# Patient Record
Sex: FEMALE | Race: WHITE
[De-identification: names, ages, dates, MRNs, and addresses within clinical notes are randomized per-mention and may not be internally consistent; named-entity substitution may affect disease eponyms.]

---

## 2017-01-20 ENCOUNTER — APPOINTMENT (OUTPATIENT)
Dept: ORTHOPEDIC SURGERY | Facility: CLINIC | Age: 57
End: 2017-01-20

## 2017-01-20 VITALS — BODY MASS INDEX: 39.24 KG/M2 | WEIGHT: 250 LBS | HEIGHT: 67 IN

## 2017-01-20 DIAGNOSIS — Z87.39 PERSONAL HISTORY OF OTHER DISEASES OF THE MUSCULOSKELETAL SYSTEM AND CONNECTIVE TISSUE: ICD-10-CM

## 2017-01-20 DIAGNOSIS — S83.232A COMPLEX TEAR OF MEDIAL MENISCUS, CURRENT INJURY, LEFT KNEE, INITIAL ENCOUNTER: ICD-10-CM

## 2017-03-06 ENCOUNTER — OUTPATIENT (OUTPATIENT)
Dept: OUTPATIENT SERVICES | Facility: HOSPITAL | Age: 57
LOS: 1 days | End: 2017-03-06
Payer: COMMERCIAL

## 2017-03-06 DIAGNOSIS — M54.16 RADICULOPATHY, LUMBAR REGION: ICD-10-CM

## 2017-03-06 PROCEDURE — 62323 NJX INTERLAMINAR LMBR/SAC: CPT

## 2017-03-06 PROCEDURE — 77003 FLUOROGUIDE FOR SPINE INJECT: CPT

## 2017-07-13 ENCOUNTER — OUTPATIENT (OUTPATIENT)
Dept: OUTPATIENT SERVICES | Facility: HOSPITAL | Age: 57
LOS: 1 days | End: 2017-07-13
Payer: COMMERCIAL

## 2017-07-13 ENCOUNTER — APPOINTMENT (OUTPATIENT)
Dept: OBGYN | Facility: CLINIC | Age: 57
End: 2017-07-13

## 2017-07-13 DIAGNOSIS — M54.16 RADICULOPATHY, LUMBAR REGION: ICD-10-CM

## 2017-07-13 PROCEDURE — 62323 NJX INTERLAMINAR LMBR/SAC: CPT

## 2017-07-13 PROCEDURE — 77003 FLUOROGUIDE FOR SPINE INJECT: CPT

## 2017-08-10 ENCOUNTER — OUTPATIENT (OUTPATIENT)
Dept: OUTPATIENT SERVICES | Facility: HOSPITAL | Age: 57
LOS: 1 days | End: 2017-08-10
Payer: COMMERCIAL

## 2017-08-10 DIAGNOSIS — M54.16 RADICULOPATHY, LUMBAR REGION: ICD-10-CM

## 2017-08-10 PROCEDURE — 77003 FLUOROGUIDE FOR SPINE INJECT: CPT

## 2017-08-10 PROCEDURE — 62323 NJX INTERLAMINAR LMBR/SAC: CPT

## 2018-02-28 ENCOUNTER — RESULT REVIEW (OUTPATIENT)
Age: 58
End: 2018-02-28

## 2018-02-28 ENCOUNTER — OUTPATIENT (OUTPATIENT)
Dept: OUTPATIENT SERVICES | Facility: HOSPITAL | Age: 58
LOS: 1 days | Discharge: HOME | End: 2018-02-28
Payer: COMMERCIAL

## 2018-02-28 VITALS
OXYGEN SATURATION: 97 % | TEMPERATURE: 98 F | HEART RATE: 72 BPM | SYSTOLIC BLOOD PRESSURE: 113 MMHG | RESPIRATION RATE: 14 BRPM | DIASTOLIC BLOOD PRESSURE: 61 MMHG

## 2018-02-28 VITALS
HEIGHT: 67 IN | HEART RATE: 65 BPM | DIASTOLIC BLOOD PRESSURE: 65 MMHG | TEMPERATURE: 98 F | OXYGEN SATURATION: 97 % | RESPIRATION RATE: 16 BRPM | SYSTOLIC BLOOD PRESSURE: 109 MMHG | WEIGHT: 250.45 LBS

## 2018-02-28 DIAGNOSIS — Z98.890 OTHER SPECIFIED POSTPROCEDURAL STATES: Chronic | ICD-10-CM

## 2018-02-28 DIAGNOSIS — E89.0 POSTPROCEDURAL HYPOTHYROIDISM: Chronic | ICD-10-CM

## 2018-02-28 LAB — HCG UR QL: NEGATIVE — SIGNIFICANT CHANGE UP

## 2018-02-28 PROCEDURE — 88305 TISSUE EXAM BY PATHOLOGIST: CPT | Mod: 26

## 2018-02-28 RX ORDER — ONDANSETRON 8 MG/1
4 TABLET, FILM COATED ORAL ONCE
Qty: 0 | Refills: 0 | Status: DISCONTINUED | OUTPATIENT
Start: 2018-02-28 | End: 2018-03-15

## 2018-02-28 RX ORDER — OXYCODONE HYDROCHLORIDE 5 MG/1
5 TABLET ORAL EVERY 4 HOURS
Qty: 0 | Refills: 0 | Status: DISCONTINUED | OUTPATIENT
Start: 2018-02-28 | End: 2018-02-28

## 2018-02-28 RX ORDER — GABAPENTIN 400 MG/1
900 CAPSULE ORAL
Qty: 0 | Refills: 0 | COMMUNITY

## 2018-02-28 RX ORDER — ACETAMINOPHEN 500 MG
1000 TABLET ORAL ONCE
Qty: 0 | Refills: 0 | Status: DISCONTINUED | OUTPATIENT
Start: 2018-02-28 | End: 2018-03-15

## 2018-02-28 RX ORDER — LEVOTHYROXINE SODIUM 125 MCG
1 TABLET ORAL
Qty: 0 | Refills: 0 | COMMUNITY

## 2018-02-28 RX ORDER — FENTANYL CITRATE 50 UG/ML
50 INJECTION INTRAVENOUS
Qty: 0 | Refills: 0 | Status: DISCONTINUED | OUTPATIENT
Start: 2018-02-28 | End: 2018-02-28

## 2018-02-28 RX ORDER — IBUPROFEN 200 MG
400 TABLET ORAL EVERY 8 HOURS
Qty: 0 | Refills: 0 | Status: DISCONTINUED | OUTPATIENT
Start: 2018-02-28 | End: 2018-03-15

## 2018-02-28 RX ORDER — SERTRALINE 25 MG/1
1 TABLET, FILM COATED ORAL
Qty: 0 | Refills: 0 | COMMUNITY

## 2018-02-28 RX ORDER — SODIUM CHLORIDE 9 MG/ML
1000 INJECTION INTRAMUSCULAR; INTRAVENOUS; SUBCUTANEOUS
Qty: 0 | Refills: 0 | Status: DISCONTINUED | OUTPATIENT
Start: 2018-02-28 | End: 2018-03-15

## 2018-02-28 RX ORDER — BUPROPION HYDROCHLORIDE 150 MG/1
0 TABLET, EXTENDED RELEASE ORAL
Qty: 0 | Refills: 0 | COMMUNITY

## 2018-02-28 NOTE — ASU DISCHARGE PLAN (ADULT/PEDIATRIC). - MEDICATION SUMMARY - MEDICATIONS TO CHANGE
I will SWITCH the dose or number of times a day I take the medications listed below when I get home from the hospital:    Synthroid 150 mcg (0.15 mg) oral tablet  -- 1 tab(s) by mouth once a day    Wellbutrin 100 mg oral tablet  -- orally once a day    Zoloft 50 mg oral tablet  -- 1 tab(s) by mouth once a day    Neurontin  -- 900 milligram(s) by mouth 1 to 2 times a day

## 2018-03-06 DIAGNOSIS — D17.0 BENIGN LIPOMATOUS NEOPLASM OF SKIN AND SUBCUTANEOUS TISSUE OF HEAD, FACE AND NECK: ICD-10-CM

## 2020-10-15 ENCOUNTER — EMERGENCY (EMERGENCY)
Facility: HOSPITAL | Age: 60
LOS: 0 days | Discharge: HOME | End: 2020-10-15
Attending: EMERGENCY MEDICINE
Payer: COMMERCIAL

## 2020-10-15 VITALS
DIASTOLIC BLOOD PRESSURE: 71 MMHG | HEART RATE: 80 BPM | SYSTOLIC BLOOD PRESSURE: 129 MMHG | OXYGEN SATURATION: 100 % | RESPIRATION RATE: 17 BRPM

## 2020-10-15 VITALS
WEIGHT: 255.07 LBS | DIASTOLIC BLOOD PRESSURE: 80 MMHG | OXYGEN SATURATION: 100 % | SYSTOLIC BLOOD PRESSURE: 137 MMHG | RESPIRATION RATE: 18 BRPM | HEART RATE: 92 BPM | HEIGHT: 67 IN | TEMPERATURE: 98 F

## 2020-10-15 DIAGNOSIS — E89.0 POSTPROCEDURAL HYPOTHYROIDISM: ICD-10-CM

## 2020-10-15 DIAGNOSIS — R06.02 SHORTNESS OF BREATH: ICD-10-CM

## 2020-10-15 DIAGNOSIS — R07.89 OTHER CHEST PAIN: ICD-10-CM

## 2020-10-15 DIAGNOSIS — Z98.84 BARIATRIC SURGERY STATUS: ICD-10-CM

## 2020-10-15 DIAGNOSIS — Z98.84 BARIATRIC SURGERY STATUS: Chronic | ICD-10-CM

## 2020-10-15 DIAGNOSIS — Z98.890 OTHER SPECIFIED POSTPROCEDURAL STATES: Chronic | ICD-10-CM

## 2020-10-15 DIAGNOSIS — E89.0 POSTPROCEDURAL HYPOTHYROIDISM: Chronic | ICD-10-CM

## 2020-10-15 PROBLEM — K82.9 DISEASE OF GALLBLADDER, UNSPECIFIED: Chronic | Status: ACTIVE | Noted: 2018-02-28

## 2020-10-15 LAB
ALBUMIN SERPL ELPH-MCNC: 3.6 G/DL — SIGNIFICANT CHANGE UP (ref 3.3–5)
ALP SERPL-CCNC: 98 U/L — SIGNIFICANT CHANGE UP (ref 40–120)
ALT FLD-CCNC: 33 U/L — SIGNIFICANT CHANGE UP (ref 12–78)
ANION GAP SERPL CALC-SCNC: 5 MMOL/L — SIGNIFICANT CHANGE UP (ref 5–17)
AST SERPL-CCNC: 33 U/L — SIGNIFICANT CHANGE UP (ref 15–37)
BASOPHILS # BLD AUTO: 0.05 K/UL — SIGNIFICANT CHANGE UP (ref 0–0.2)
BASOPHILS NFR BLD AUTO: 0.4 % — SIGNIFICANT CHANGE UP (ref 0–2)
BILIRUB SERPL-MCNC: 0.5 MG/DL — SIGNIFICANT CHANGE UP (ref 0.2–1.2)
BUN SERPL-MCNC: 20 MG/DL — SIGNIFICANT CHANGE UP (ref 7–23)
CALCIUM SERPL-MCNC: 8.7 MG/DL — SIGNIFICANT CHANGE UP (ref 8.5–10.1)
CHLORIDE SERPL-SCNC: 107 MMOL/L — SIGNIFICANT CHANGE UP (ref 96–108)
CO2 SERPL-SCNC: 28 MMOL/L — SIGNIFICANT CHANGE UP (ref 22–31)
CREAT SERPL-MCNC: 0.81 MG/DL — SIGNIFICANT CHANGE UP (ref 0.5–1.3)
D DIMER BLD IA.RAPID-MCNC: 213 NG/ML DDU — SIGNIFICANT CHANGE UP
EOSINOPHIL # BLD AUTO: 0.17 K/UL — SIGNIFICANT CHANGE UP (ref 0–0.5)
EOSINOPHIL NFR BLD AUTO: 1.3 % — SIGNIFICANT CHANGE UP (ref 0–6)
GLUCOSE SERPL-MCNC: 86 MG/DL — SIGNIFICANT CHANGE UP (ref 70–99)
HCT VFR BLD CALC: 42.1 % — SIGNIFICANT CHANGE UP (ref 34.5–45)
HGB BLD-MCNC: 13.6 G/DL — SIGNIFICANT CHANGE UP (ref 11.5–15.5)
IMM GRANULOCYTES NFR BLD AUTO: 0.3 % — SIGNIFICANT CHANGE UP (ref 0–1.5)
LYMPHOCYTES # BLD AUTO: 1.43 K/UL — SIGNIFICANT CHANGE UP (ref 1–3.3)
LYMPHOCYTES # BLD AUTO: 10.8 % — LOW (ref 13–44)
MCHC RBC-ENTMCNC: 30 PG — SIGNIFICANT CHANGE UP (ref 27–34)
MCHC RBC-ENTMCNC: 32.3 GM/DL — SIGNIFICANT CHANGE UP (ref 32–36)
MCV RBC AUTO: 92.9 FL — SIGNIFICANT CHANGE UP (ref 80–100)
MONOCYTES # BLD AUTO: 0.96 K/UL — HIGH (ref 0–0.9)
MONOCYTES NFR BLD AUTO: 7.2 % — SIGNIFICANT CHANGE UP (ref 2–14)
NEUTROPHILS # BLD AUTO: 10.62 K/UL — HIGH (ref 1.8–7.4)
NEUTROPHILS NFR BLD AUTO: 80 % — HIGH (ref 43–77)
NT-PROBNP SERPL-SCNC: 140 PG/ML — HIGH (ref 0–125)
PLATELET # BLD AUTO: 273 K/UL — SIGNIFICANT CHANGE UP (ref 150–400)
POTASSIUM SERPL-MCNC: 3.8 MMOL/L — SIGNIFICANT CHANGE UP (ref 3.5–5.3)
POTASSIUM SERPL-SCNC: 3.8 MMOL/L — SIGNIFICANT CHANGE UP (ref 3.5–5.3)
PROT SERPL-MCNC: 7.5 GM/DL — SIGNIFICANT CHANGE UP (ref 6–8.3)
RBC # BLD: 4.53 M/UL — SIGNIFICANT CHANGE UP (ref 3.8–5.2)
RBC # FLD: 15 % — HIGH (ref 10.3–14.5)
SARS-COV-2 RNA SPEC QL NAA+PROBE: SIGNIFICANT CHANGE UP
SODIUM SERPL-SCNC: 140 MMOL/L — SIGNIFICANT CHANGE UP (ref 135–145)
TROPONIN I SERPL-MCNC: <0.015 NG/ML — SIGNIFICANT CHANGE UP (ref 0.01–0.04)
TROPONIN I SERPL-MCNC: <0.015 NG/ML — SIGNIFICANT CHANGE UP (ref 0.01–0.04)
WBC # BLD: 13.27 K/UL — HIGH (ref 3.8–10.5)
WBC # FLD AUTO: 13.27 K/UL — HIGH (ref 3.8–10.5)

## 2020-10-15 PROCEDURE — 80053 COMPREHEN METABOLIC PANEL: CPT

## 2020-10-15 PROCEDURE — 85379 FIBRIN DEGRADATION QUANT: CPT

## 2020-10-15 PROCEDURE — 71045 X-RAY EXAM CHEST 1 VIEW: CPT

## 2020-10-15 PROCEDURE — 93010 ELECTROCARDIOGRAM REPORT: CPT

## 2020-10-15 PROCEDURE — 83880 ASSAY OF NATRIURETIC PEPTIDE: CPT

## 2020-10-15 PROCEDURE — 36415 COLL VENOUS BLD VENIPUNCTURE: CPT

## 2020-10-15 PROCEDURE — U0003: CPT

## 2020-10-15 PROCEDURE — 84484 ASSAY OF TROPONIN QUANT: CPT

## 2020-10-15 PROCEDURE — 85025 COMPLETE CBC W/AUTO DIFF WBC: CPT

## 2020-10-15 PROCEDURE — 99285 EMERGENCY DEPT VISIT HI MDM: CPT

## 2020-10-15 PROCEDURE — 71045 X-RAY EXAM CHEST 1 VIEW: CPT | Mod: 26

## 2020-10-15 PROCEDURE — 93005 ELECTROCARDIOGRAM TRACING: CPT

## 2020-10-15 PROCEDURE — 99284 EMERGENCY DEPT VISIT MOD MDM: CPT | Mod: 25

## 2020-10-15 NOTE — ED ADULT NURSE REASSESSMENT NOTE - NS ED NURSE REASSESS COMMENT FT1
received report from rn puja- pt resting comfortably, vitals stable, awaiting 2nd troponin at 9, pt aware of plan of care

## 2020-10-15 NOTE — ED PROVIDER NOTE - PROGRESS NOTE DETAILS
Attending Dr. Abelardo Andres Attending Dr. Abelardo Andresd and d/w about pt.  Pt can be d/c and follow up with outpt rich Attending Andres, pt updated on results.  pt in NAD.  Plan d/c.

## 2020-10-15 NOTE — ED PROVIDER NOTE - PSH
H/O bariatric surgery  gastric sleeve - july 2020  H/O thyroidectomy    History of cholecystectomy

## 2020-10-15 NOTE — ED ADULT NURSE NOTE - OBJECTIVE STATEMENT
pt. presents to ED ambulatory complaining of non-radiating midsternal chest pressure onset at 3am. c/o mild b/l arm tingling. Pt. denies having actual chest pain, N/V/ dizziness/palpitations. (-)fevers/chills/cough. PMH: hypothyroid, PSH: gastric sleeve, endarterectomy, thyroidectomy.

## 2020-10-15 NOTE — ED PROVIDER NOTE - PATIENT PORTAL LINK FT
You can access the FollowMyHealth Patient Portal offered by [facility] by registering at the following website: [email or web address]. By joining [facility]’s FollowMyHealth portal, you will also be able to view your health information using other applications (apps) compatible with our system.

## 2020-10-15 NOTE — ED PROVIDER NOTE - OBJECTIVE STATEMENT
59 yo pt presents to ED for chest pressure and SOB.  Pt started with symptoms today at 3am.  Pt woke up with pressure.  Pt noticed SOB with exertion.  Pressure mid chest with no radiation.  No sweats.  No travel, no sick contacts.  No fall or trauma.  No cough, fever, n/v/d.  pt s/p bariatric surgery at [facility] in July 2020 and has lost 40 lbs.  PMD = Dr. page.

## 2020-10-15 NOTE — ED ADULT TRIAGE NOTE - CHIEF COMPLAINT QUOTE
woke up with chest pain worsening with deep breath at 3am this morning. + sob. Denies palpitations, nausea, vomiting.

## 2020-10-15 NOTE — ED PROVIDER NOTE - NSFOLLOWUPINSTRUCTIONS_ED_ALL_ED_FT
Please call and follow up with your doctor in 1-3 days.  Please call and follow up with your bariatric surgeon.    Chest Pain    WHAT YOU NEED TO KNOW:    Chest pain can be caused by a range of conditions, from not serious to life-threatening. Chest pain can be a symptom of a digestive problem, such as acid reflux or a stomach ulcer. An anxiety attack or a strong emotion, such as anger, can also cause chest pain. Infection, inflammation, or a fracture in the bones or cartilage in your chest can cause pain or discomfort. Sometimes chest pain or pressure is caused by poor blood flow to your heart (angina). Chest pain may also be caused by life-threatening conditions such as a heart attack or blood clot in your lungs.     DISCHARGE INSTRUCTIONS:    Call 911 if:     You have any of the following signs of a heart attack:   Squeezing, pressure, or pain in your chest       and any of the following:   Discomfort or pain in your back, neck, jaw, stomach, or arm       Shortness of breath      Nausea or vomiting      Lightheadedness or a sudden cold sweat        Return to the emergency department if:     You have chest discomfort that gets worse, even with medicine.      You cough or vomit blood.       Your bowel movements are black or bloody.       You cannot stop vomiting, or it hurts to swallow.     Contact your healthcare provider if:     You have questions or concerns about your condition or care.        Medicines:     Medicines may be given to treat the cause of your chest pain. Examples include pain medicine, anxiety medicine, or medicines to increase blood flow to your heart.       Do not take certain medicines without asking your healthcare provider first. These include NSAIDs, herbal or vitamin supplements, or hormones (estrogen or progestin).       Take your medicine as directed. Contact your healthcare provider if you think your medicine is not helping or if you have side effects. Tell him or her if you are allergic to any medicine. Keep a list of the medicines, vitamins, and herbs you take. Include the amounts, and when and why you take them. Bring the list or the pill bottles to follow-up visits. Carry your medicine list with you in case of an emergency.    Follow up with your healthcare provider within 72 hours, or as directed: You may need to return for more tests to find the cause of your chest pain. You may be referred to a specialist, such as a cardiologist or gastroenterologist. Write down your questions so you remember to ask them during your visits.     Healthy living tips: The following are general healthy guidelines. If your chest pain is caused by a heart problem, your healthcare provider will give you specific guidelines to follow.    Do not smoke. Nicotine and other chemicals in cigarettes and cigars can cause lung and heart damage. Ask your healthcare provider for information if you currently smoke and need help to quit. E-cigarettes or smokeless tobacco still contain nicotine. Talk to your healthcare provider before you use these products.       Eat a variety of healthy, low-fat, low-salt foods. Healthy foods include fruits, vegetables, whole-grain breads, low-fat dairy products, beans, lean meats, and fish. Ask for more information about a heart healthy diet.      Drink plenty of water every day. Your body is made of mostly water. Water helps your body to control your temperature and blood pressure. Ask your healthcare provider how much water you should drink every day.      Ask about activity. Your healthcare provider will tell you which activities to limit or avoid. Ask when you can drive, return to work, and have sex. Ask about the best exercise plan for you.      Maintain a healthy weight. Ask your healthcare provider how much you should weigh. Ask him or her to help you create a weight loss plan if you are overweight.       Get the flu and pneumonia vaccines. All adults should get the influenza (flu) vaccine. Get it every year as soon as it becomes available. The pneumococcal vaccine is given to adults aged 65 years or older. The vaccine is given every 5 years to prevent pneumococcal disease, such as pneumonia.    If you have a stent:     Carry your stent card with you at all times.       Let all healthcare providers know that you have a stent.

## 2020-10-15 NOTE — ED PROVIDER NOTE - CPE EDP CARDIAC NORM
normal...
Implemented All Universal Safety Interventions:  [place] to call system. Call bell, personal items and telephone within reach. Instruct patient to call for assistance. Room bathroom lighting operational. Non-slip footwear when patient is off stretcher. Physically safe environment: no spills, clutter or unnecessary equipment. Stretcher in lowest position, wheels locked, appropriate side rails in place.

## 2020-10-15 NOTE — ED ADULT NURSE NOTE - NSIMPLEMENTINTERV_GEN_ALL_ED
Implemented All Universal Safety Interventions:  [place] to call system. Call bell, personal items and telephone within reach. Instruct patient to call for assistance. Room bathroom lighting operational. Non-slip footwear when patient is off stretcher. Physically safe environment: no spills, clutter or unnecessary equipment. Stretcher in lowest position, wheels locked, appropriate side rails in place.

## 2021-06-03 NOTE — ED ADULT NURSE NOTE - NS ED NOTE ABUSE SUSPICION NEGLECT YN
No Xeldaviz Pregnancy And Lactation Text: This medication is Pregnancy Category D and is not considered safe during pregnancy.  The risk during breast feeding is also uncertain.

## 2022-05-26 ENCOUNTER — NON-APPOINTMENT (OUTPATIENT)
Age: 62
End: 2022-05-26

## 2023-01-29 ENCOUNTER — NON-APPOINTMENT (OUTPATIENT)
Age: 63
End: 2023-01-29

## 2023-03-13 NOTE — ASU PREOP CHECKLIST - HEIGHT IN FEET
RE: Plan of Care    Dear Dr. Megan Otto MD    Thank you for referring Hiral Avitia. The following information reflects my assessment and plan of care.           Plan of Care 23   Effective from: 3/13/2023  Effective to: 2023    Plan ID: [id number]            Participants as of 3/15/2023    Name Type Comments Contact Info    Megan Otto MD Referring Provider      Veto Cordero, PT Physical Therapist             Hiral Avitia MRN:[id number] (:2000 23 year old F)             Evaluation     Author: Veto Cordero PT Status: Addendum Last edited: 3/13/2023  1:56 PM       Physical Therapy Evaluation    Visit Type: Initial Evaluation  Visit: 1  Referring Provider: Megan Otto MD  Medical Diagnosis (from order): Diagnosis Information    Diagnosis  844.2 (ICD-9-CM) - S83.519A (ICD-10-CM) - ACL injury tear  836.2 (ICD-9-CM) - S83.206A (ICD-10-CM) - Tear of meniscus of right knee, unspecified meniscus, unspecified tear type, unspecified whether old or current tear       Treatment Diagnosis: left knee with increased pain/symptoms, impaired range of motion, impaired posture, impaired muscle length/flexibility, impaired joint play/mobility, impaired tissue/wound healing, impaired mobility, impaired motor function/performance/coordination, impaired balance, impaired strength, impaired tissue mobility, increased swelling, impaired gait, impaired activity tolerance, increased risk for falls and impaired body mechanics.  Onset  - Procedure: 2022  Chart reviewed at time of initial evaluation (relevant co-morbidities, allergies, tests and medications listed):   S/P ACL reconstruction      SUBJECTIVE                                                                                                               Patient referred to physical therapy for initial evaluation, status post left leg, ACL/meniscal tear. Referral: \"S/P Recon BTB 22.\"    Patient reporting pain and symptoms  started approximately 2 years ago. Thought she had a muscle pull or tear. She went over her MRI again in November of 2022, and was diagnosed with an ACL tear and meniscal tear.  Had been walking with a limp for approximately 2 years, prior to have reconstruction surgery.  Underwent procedure on 12/21/2022. She had a brace on her left knee for 4 weeks after surgery. She is now approximately ~11.5 weeks post-op.  She was going through formal physical therapy from the middle of January 2023 until February 15th, 2023 at [facility].  She has been performing exercises at home, on her own.      Currently, patient rates pain and discomfort at a 0/10 while seated in a chair. Reports that she gets pain when she wakes up in the morning. Typically after sitting for prolonged periods of time, and then rising to standing it is more painful.    Symptoms are described as a strong ache, with a shooting pain in the knee. Reports no instability in the left knee. Reports no significant injuries since surgeries.      Primary functional limitations at this time are with weightbearing activities, primarily with walking.     Pain / Symptoms  - Pain/symptom is: intermittent  - Pain rating (out of 10): Current: 0 ; Worst: 8  - Location: Left knee - Lateral aspect  - Quality / Description: discomfort, ache, sharp  - Alleviating Factors: relaxation techniques, rest    Function:   Limitations / Exacerbation Factors:   - Patient reports pain, difficulty and increased time with function reported below.  - sleep disturbed, grooming/hygiene/self-care activities, lower body dressing, standing tasks, house/yard work, bending/squatting/lifting, floor transfers and low transfer (toilet/couch), stairs, walking quickly as required to cross a street/exit a building rapidly, community distances  Prior Level of Function: worsening pain and function, therefore underwent surgery,    Patient Goals: decreased pain, increased motion, increased strength  and return to sport/leisure activities.    Prior treatment  - outpatient PT  - Discharged from hospital, home health, or skilled nursing facility in last 30 days: no  Home Environment   - Patient lives with: Lives with: family.  - Type of home: multiple level home  - Assistance available: consistent  - Denies 2 or more falls or an unexplained fall with injury in the last year.  - Feel safe at home / work / school: yes      OBJECTIVE                                                                                                                    Posture:  LLE: decreased weight in standing, genu valgum      Range of Motion (ROM)   (degrees unless noted; active unless noted; norms in ( ); negative=lacking to 0, positive=beyond 0)  Knee:   - Flexion (150):      • Left:  117  Pain  Passive: 124   - Extension (0-10):      • Left:   Passive: 2    Strength  (out of 5 unless noted, standard test position unless noted)   Hip:    - Flexion:        • Left: 4+        • Right: 5  Knee:    - Flexion:        • Left: 4        • Right: 5    - Extension:        • Left: 4-, pain        • Right: 5  Ankle:    - Dorsiflexion:        • Left: 4+             Standing Balance  (RE = base of support)  Firm Surface: Single Leg     - Left, Eyes Open (sec): 20     - Left, Eyes Open details: independent     - Right, Eyes Open (sec): 30     - Right, Eyes Open details: independent       Ambulation / Gait  - Description: antalgic  Gait Analysis   - Limb Advancement/Swing Phase     • Hip: - Left: externally rotated     • Knee: - Left: limited flexion       Functional Testing  Double Leg Squat  - Left: pain and knee valgus  - Right: body weight shift  - Number of reps able to complete: 10  - Significant pain in Left knee.       Outcome/Assessments  Outcome Measures:   Jr HU. Raw Score: 22  KOOS  Calculated Score: 31.31  (interval score: 0=total knee disability to 100=perfect knee health) see flowsheet for additional  documentation      Treatment     Therapeutic Exercise  -Patient education on the findings of the examination and expected timeframe for therapy, as well as recovery potential. Discussed upcoming plan of care and importance of completion of home exercise program as prescribed.  -Instruction and performance in below listed home exercise program with cueing and correction as needed and adjustment based on tolerance.    Skilled input: verbal instruction/cues, tactile instruction/cues and posture correction    Writer verbally educated and received verbal consent for hand placement, positioning of patient, and techniques to be performed today from patient for clothing adjustments for techniques, therapist position for techniques and hand placement and palpation for techniques as described above and how they are pertinent to the patient's plan of care.    Home Exercise Program  Access Code: [email or web address]  URL: [email or web address]  Date: 03/13/2023  Prepared by: Veto Cordero    Exercises  Active Straight Leg Raise with Quad Set - 1 x daily - 2 sets - 10 reps  Single Leg Stance - 1 x daily - 3 sets - 30 seconds hold  Standing Heel Raises - 1 x daily - 7 x weekly - 2 sets - 10 reps  Step Up - 1 x daily - 2 sets - 10 reps  Squat - 1 x daily - 7 x weekly - 2 sets - 10 reps  Lateral Lunge - 1 x daily - 7 x weekly - 2 sets - 10 reps      ASSESSMENT                                                                                                          23 year old patient has reported functional limitations listed above impacted by signs and symptoms consistent with treatment diagnosis below.  Treatment Diagnosis:   - Involved: left knee.  - Symptoms/impairments: increased pain/symptoms, impaired range of motion, impaired posture, impaired muscle length/flexibility, impaired joint play/mobility, impaired tissue/wound healing, impaired mobility, impaired motor function/performance/coordination, impaired balance,  impaired strength, impaired tissue mobility, increased swelling, impaired gait, impaired activity tolerance, increased risk for falls and impaired body mechanics.    Patient's signs and symptoms consistent with status post Left knee ACL BTB reconstruction (DOS: 12/21/2022), and meniscal tear.  Patient is now approximately ~11.5 weeks post-op. She demonstrates -2-0-126 degrees of left knee range of motion. Strength in the left lower extremity is fair to poor at this time, significantly weaker than the right leg. Patient heavily favors right lower extremity during ambulation and dynamic motions such as squatting. Significant body weight shift to the right during performance of double leg squat, and transfers to and from a seated position Pain levels, strength deficits, and range of motion limitations are causing functional mobility limitations.   Pain/symptoms after session (out of 10): 0    Prognosis: patient will benefit from skilled therapy  Rehabilitative potential is: good.  Predicted patient presentation: Moderate (evolving) - Patient comorbidities and complexities, as defined above, may have varying impact on steady progress for prescribed plan of care.    Education:   - Present and ready to learn: patient  - Results of above outlined education: Verbalizes understanding, Demonstrates understanding and Needs reinforcement    PLAN                                                                                                                         The following skilled interventions to be implemented to achieve goals listed below:  Neuromuscular Re-Education (49921)  Therapeutic Activity (36978)  Therapeutic Exercise (82373)  Manual Therapy (18875)  Gait Training (79678)    Frequency / Duration  2 times per week tapering as patient progresses for 8 weeks for an estimated total of 16 visits    Patient involved in and agreed to plan of care and goals.  Patient offered attendance policy at [facility].    Suggestions for  next session as indicated: Progress per plan of care, Continue progression with left knee range of motion and strengthening as tolerated. Progress dynamic stability. Emphasis on left quadriceps strength.      Goals  Decrease left knee pain/symptoms to 1/10 at worst.  Improve involved knee strength to 5/5.  Improve involved knee ROM to 130 degrees flexion.  The above improvements in impairments to assist in obtaining goals listed below  Long Term Goals: to be met by end of plan of care  1. Patient will ambulate with reported manageable/tolerable difficulty no assistive device.  2. Patient will ascend and descend 1 flight of steps without pain for safe mobility within home, to get to bedroom.  3. Patient will stand for 30 minutes for completion of household tasks such as cleaning, vacuuming, mopping tasks.  4. Patient will be independent with progressed and modified home exercise program in order improve functional independence with all transfers, gait, and stair negotiation.  5. Patient will demonstrate normalized gait pattern with equal stance time and normal heel to toe foot strike on both lower extremities for improved mobility in the home and community.  6. KOOS jr: Patient will complete form to reflect an improved raw score to less than or equal to 0 (0-28); Interval Score: 100.00 (0-100) to indicate patient reported improvement in function/disability/impairment. (minimal clinically important difference 15.1 in raw score)      Therapy procedure time and total treatment time can be found documented on the Time Entry flowsheet           Current Participants as of 3/15/2023    Name Type Comments Contact Info    Megan Otto MD Referring Provider      Signature pending    Veto Cordero PT Physical Therapist      Signature pending            Please complete the attached form to indicate your approval of the plan of care upon receipt and fax signed form to the fax number below.  Insurance compliance requires  your approval be on file.  Should you have any questions, feel free to contact me.     Veto Cordero, PT  [facility]  [address] [place] [address]  Phone: [phone]  Fax: [phone]                RE: Plan of Care for Hiral Avitia, YOB: 2000     I certify the need for these services, furnished under this plan of treatment and while under my care.  I agree with the plan of care as stated and request that therapy proceed.        __________________________________________________________________________________  MD Signature         Date   Time   5

## 2023-12-17 ENCOUNTER — NON-APPOINTMENT (OUTPATIENT)
Age: 63
End: 2023-12-17